# Patient Record
Sex: FEMALE | Race: WHITE | NOT HISPANIC OR LATINO
[De-identification: names, ages, dates, MRNs, and addresses within clinical notes are randomized per-mention and may not be internally consistent; named-entity substitution may affect disease eponyms.]

---

## 2018-06-18 ENCOUNTER — RESULT REVIEW (OUTPATIENT)
Age: 43
End: 2018-06-18

## 2019-03-04 ENCOUNTER — APPOINTMENT (OUTPATIENT)
Dept: ORTHOPEDIC SURGERY | Facility: CLINIC | Age: 44
End: 2019-03-04
Payer: COMMERCIAL

## 2019-03-04 VITALS
BODY MASS INDEX: 29.02 KG/M2 | HEART RATE: 99 BPM | HEIGHT: 64 IN | DIASTOLIC BLOOD PRESSURE: 74 MMHG | SYSTOLIC BLOOD PRESSURE: 109 MMHG | WEIGHT: 170 LBS

## 2019-03-04 DIAGNOSIS — Z86.018 PERSONAL HISTORY OF OTHER BENIGN NEOPLASM: ICD-10-CM

## 2019-03-04 DIAGNOSIS — Z80.9 FAMILY HISTORY OF MALIGNANT NEOPLASM, UNSPECIFIED: ICD-10-CM

## 2019-03-04 DIAGNOSIS — M54.9 DORSALGIA, UNSPECIFIED: ICD-10-CM

## 2019-03-04 DIAGNOSIS — Z87.09 PERSONAL HISTORY OF OTHER DISEASES OF THE RESPIRATORY SYSTEM: ICD-10-CM

## 2019-03-04 DIAGNOSIS — M51.36 OTHER INTERVERTEBRAL DISC DEGENERATION, LUMBAR REGION: ICD-10-CM

## 2019-03-04 PROCEDURE — 72170 X-RAY EXAM OF PELVIS: CPT | Mod: 59

## 2019-03-04 PROCEDURE — 99204 OFFICE O/P NEW MOD 45 MIN: CPT | Mod: 25

## 2019-03-04 PROCEDURE — 72110 X-RAY EXAM L-2 SPINE 4/>VWS: CPT

## 2019-03-04 PROCEDURE — 96372 THER/PROPH/DIAG INJ SC/IM: CPT

## 2019-03-04 RX ORDER — OMEPRAZOLE MAGNESIUM 20 MG/1
TABLET, DELAYED RELEASE ORAL
Refills: 0 | Status: ACTIVE | COMMUNITY

## 2019-03-04 NOTE — DISCUSSION/SUMMARY
[Medication Risks Reviewed] : Medication risks reviewed [de-identified] : The patient presents with acute back pain which is likely related to an annular tear of lumbar intervertebral disc. Offered her an injection of Toradol for this and she wanted to proceed. Under sterile conditions 30 mg of Toradol was administered intramuscularly by the LPN without incident. I prescribed her a course of diclofenac baclofen and physical therapy. She can use a lumbar corset for symptomatic relief as well. I will see her back in 2 weeks for reevaluation. If her symptoms persist or worsen an MRI lumbar spine can be considered.\par \par The patient was educated regarding their condition, treatment options as well as prescribed course of treatment. \par Risks and benefits as well as alternatives to the proposed treatment were also provided to the patient \par They were given the opportunity to have all their questions answered to their satisfaction.\par \par Vital signs were reviewed with the patient and the patient was instructed to followup with their primary care provider for further management.\par \par Healthy lifestyle recommendations were also made including a tobacco free lifestyle, proper diet, and weight control.

## 2019-03-04 NOTE — CONSULT LETTER
[Dear  ___] : Dear  [unfilled], [Consult Letter:] : I had the pleasure of evaluating your patient, [unfilled]. [FreeTextEntry2] : Kush Farrell [FreeTextEntry1] : Thank you for this referral. I have enclosed my note for your review. Please feel free to contact my office if you have additional questions regarding this patient.\par \par Regards,\par Jamie Hendricks MD, FACS, FAAOS\par \par  of Orthopaedic Surgery\par [facility] [employer]\par Spinal Reconstruction Surgery\par Minimally Invasive Spinal Surgery\par [facility]
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
## 2019-03-04 NOTE — PHYSICAL EXAM
[Stooped] : stooped [LE] : Sensory: Intact in bilateral lower extremities [Knee] : patellar 2+ and symmetric bilaterally [Ankle] : ankle 2+ and symmetric bilaterally [DP] : dorsalis pedis 2+ and symmetric bilaterally [PT] : posterior tibial 2+ and symmetric bilaterally [Obese] : obese [SLR] : negative straight leg raise [de-identified] : The pt is awake, alert and oriented to self, place and time, is comfortable and in no acute distress. Inspection of neck, back and lower extremities bilaterally reveals no rashes or ecchymotic lesions.  There is no obvious abnormal spinal curvature in the sagittal and coronal planes. There is no tenderness over the cervical, thoracic or lumbar spine, or the paraspinal or upper and lower extremities musculature. There is no sacroiliac tenderness. No greater trochanteric tenderness bilaterally. No atrophy or abnormal movements noted in the upper or lower extremities. There is no swelling noted in the upper or lower extremities bilaterally. No cervical lymphadenopathy noted anteriorly. No joint laxity noted in the upper and lower extremity joints bilaterally.\par Hip range of motion is degrees internal rotation 30° external rotation without pain. Full range of motion of the shoulders bilaterally with no significant pain\par There is no groin pain with hip internal rotation and a negative ELENITA test bilaterally.  [de-identified] : 4 views lumbar spine obtained today demonstrate minimal right-sided lumbar curve. Hyperlordosis noted lumbar spine. There is preservation of disc height throughout with minimal retrolisthesis suspected and L5-S1. No acute fractures. No dynamic instability between flexion-extension.\par \par AP pelvis demonstrates normal appearance of the hips bilaterally. No acute fractures there is no degeneration.

## 2019-04-22 ENCOUNTER — APPOINTMENT (OUTPATIENT)
Dept: ORTHOPEDIC SURGERY | Facility: CLINIC | Age: 44
End: 2019-04-22
Payer: COMMERCIAL

## 2019-04-22 VITALS — DIASTOLIC BLOOD PRESSURE: 88 MMHG | HEART RATE: 96 BPM | SYSTOLIC BLOOD PRESSURE: 129 MMHG

## 2019-04-22 PROCEDURE — 99214 OFFICE O/P EST MOD 30 MIN: CPT | Mod: 25

## 2019-04-22 PROCEDURE — 72050 X-RAY EXAM NECK SPINE 4/5VWS: CPT

## 2019-04-22 PROCEDURE — 96372 THER/PROPH/DIAG INJ SC/IM: CPT

## 2019-04-22 RX ORDER — KETOROLAC TROMETHAMINE 30 MG/ML
30 INJECTION, SOLUTION INTRAMUSCULAR; INTRAVENOUS
Qty: 1 | Refills: 0 | Status: COMPLETED | OUTPATIENT
Start: 2019-04-22

## 2019-04-22 RX ADMIN — KETOROLAC TROMETHAMINE 0 MG/ML: 30 INJECTION, SOLUTION INTRAMUSCULAR; INTRAVENOUS at 00:00

## 2019-05-23 ENCOUNTER — RX RENEWAL (OUTPATIENT)
Age: 44
End: 2019-05-23

## 2019-07-23 ENCOUNTER — APPOINTMENT (OUTPATIENT)
Dept: SURGERY | Facility: CLINIC | Age: 44
End: 2019-07-23
Payer: COMMERCIAL

## 2019-07-23 VITALS
BODY MASS INDEX: 27.14 KG/M2 | RESPIRATION RATE: 16 BRPM | SYSTOLIC BLOOD PRESSURE: 153 MMHG | HEART RATE: 102 BPM | DIASTOLIC BLOOD PRESSURE: 96 MMHG | WEIGHT: 159 LBS | HEIGHT: 64 IN | TEMPERATURE: 98.7 F | OXYGEN SATURATION: 98 %

## 2019-07-23 DIAGNOSIS — K62.89 OTHER SPECIFIED DISEASES OF ANUS AND RECTUM: ICD-10-CM

## 2019-07-23 DIAGNOSIS — Z86.018 PERSONAL HISTORY OF OTHER BENIGN NEOPLASM: ICD-10-CM

## 2019-07-23 PROCEDURE — 46600 DIAGNOSTIC ANOSCOPY SPX: CPT

## 2019-07-23 PROCEDURE — 99213 OFFICE O/P EST LOW 20 MIN: CPT | Mod: 25

## 2019-07-23 RX ORDER — GABAPENTIN 300 MG/1
300 CAPSULE ORAL
Qty: 30 | Refills: 0 | Status: DISCONTINUED | COMMUNITY
Start: 2019-04-22 | End: 2019-07-23

## 2019-07-23 RX ORDER — PRAMOXINE HYDROCHLORIDE 10 MG/ML
1 LOTION TOPICAL 4 TIMES DAILY
Qty: 1 | Refills: 3 | Status: ACTIVE | COMMUNITY
Start: 2019-07-23 | End: 1900-01-01

## 2019-07-23 RX ORDER — OMEPRAZOLE 20 MG/1
20 CAPSULE, DELAYED RELEASE ORAL
Qty: 60 | Refills: 0 | Status: DISCONTINUED | COMMUNITY
Start: 2018-10-05 | End: 2019-07-23

## 2019-07-23 RX ORDER — MULTIVITAMIN
CAPSULE ORAL
Refills: 0 | Status: ACTIVE | COMMUNITY

## 2019-07-23 NOTE — HISTORY OF PRESENT ILLNESS
[FreeTextEntry1] : The patient is an otherwise healthy 44-year-old woman who developed anal discomfort after an episode of diarrhea 2 months ago. The discomfort has improved however she still gets itching and. She denies rectal bleeding. She has no family history of colorectal cancer.

## 2019-07-23 NOTE — CONSULT LETTER
[Dear  ___] : Dear  [unfilled], [Consult Letter:] : I had the pleasure of evaluating your patient, [unfilled]. [Consult Closing:] : Thank you very much for allowing me to participate in the care of this patient.  If you have any questions, please do not hesitate to contact me. [Please see my note below.] : Please see my note below. [Sincerely,] : Sincerely, [FreeTextEntry3] : Mp Smith M.D., F.A.C.S, F.A.S.C.R.S

## 2019-07-23 NOTE — PHYSICAL EXAM
[Wheezing] : no wheezing was heard [Abdomen Tenderness] : ~T No ~M abdominal tenderness [Normal Rate and Rhythm] : normal rate and rhythm [Alert] : alert [No Rash or Lesion] : No rash or lesion [de-identified] : Perianal inspection reveals normal anal rectal tone. There is mild perianal excoriation. There is no fissure fistula or abscess. Anoscopy is normal except for mildly enlarged nonbleeding internal hemorrhoids. [Calm] : calm [de-identified] : nad [de-identified] : nl

## 2019-07-23 NOTE — ASSESSMENT
[FreeTextEntry1] : In summary the patient has mild pruritus and may have had an anal fissur which subsequently healed. I recommended fiber supplementation topical Desitin and prescribed Prax as needed. As she is 44 years old also recommended she undergo a screening colonoscopy once her perianal complaints have resolved

## 2019-08-26 ENCOUNTER — RESULT REVIEW (OUTPATIENT)
Age: 44
End: 2019-08-26

## 2020-06-22 ENCOUNTER — APPOINTMENT (OUTPATIENT)
Dept: ORTHOPEDIC SURGERY | Facility: CLINIC | Age: 45
End: 2020-06-22
Payer: COMMERCIAL

## 2020-06-22 VITALS
BODY MASS INDEX: 27.14 KG/M2 | DIASTOLIC BLOOD PRESSURE: 91 MMHG | HEIGHT: 64 IN | SYSTOLIC BLOOD PRESSURE: 142 MMHG | WEIGHT: 159 LBS | TEMPERATURE: 98.5 F | HEART RATE: 106 BPM

## 2020-06-22 DIAGNOSIS — G56.01 CARPAL TUNNEL SYNDROME, RIGHT UPPER LIMB: ICD-10-CM

## 2020-06-22 DIAGNOSIS — M54.2 CERVICALGIA: ICD-10-CM

## 2020-06-22 PROCEDURE — 99214 OFFICE O/P EST MOD 30 MIN: CPT

## 2020-06-22 PROCEDURE — 72050 X-RAY EXAM NECK SPINE 4/5VWS: CPT

## 2020-06-22 RX ORDER — BACLOFEN 10 MG/1
10 TABLET ORAL 3 TIMES DAILY
Qty: 30 | Refills: 0 | Status: ACTIVE | COMMUNITY
Start: 2019-03-04 | End: 1900-01-01

## 2020-06-22 RX ORDER — DICLOFENAC SODIUM 50 MG/1
50 TABLET, DELAYED RELEASE ORAL
Qty: 30 | Refills: 2 | Status: ACTIVE | COMMUNITY
Start: 2019-03-04 | End: 1900-01-01

## 2020-07-08 ENCOUNTER — OUTPATIENT (OUTPATIENT)
Dept: OUTPATIENT SERVICES | Facility: HOSPITAL | Age: 45
LOS: 1 days | End: 2020-07-08
Payer: COMMERCIAL

## 2020-07-08 ENCOUNTER — APPOINTMENT (OUTPATIENT)
Dept: MRI IMAGING | Facility: CLINIC | Age: 45
End: 2020-07-08

## 2020-07-08 DIAGNOSIS — Z00.8 ENCOUNTER FOR OTHER GENERAL EXAMINATION: ICD-10-CM

## 2020-07-08 PROCEDURE — 72141 MRI NECK SPINE W/O DYE: CPT | Mod: 26

## 2020-07-08 PROCEDURE — 72141 MRI NECK SPINE W/O DYE: CPT

## 2020-08-03 ENCOUNTER — APPOINTMENT (OUTPATIENT)
Dept: ORTHOPEDIC SURGERY | Facility: CLINIC | Age: 45
End: 2020-08-03
Payer: COMMERCIAL

## 2020-08-03 VITALS
BODY MASS INDEX: 27.14 KG/M2 | HEIGHT: 64 IN | WEIGHT: 159 LBS | TEMPERATURE: 97.8 F | HEART RATE: 102 BPM | DIASTOLIC BLOOD PRESSURE: 80 MMHG | SYSTOLIC BLOOD PRESSURE: 116 MMHG

## 2020-08-03 DIAGNOSIS — M54.12 RADICULOPATHY, CERVICAL REGION: ICD-10-CM

## 2020-08-03 DIAGNOSIS — M50.30 OTHER CERVICAL DISC DEGENERATION, UNSPECIFIED CERVICAL REGION: ICD-10-CM

## 2020-08-03 PROCEDURE — 99214 OFFICE O/P EST MOD 30 MIN: CPT

## 2020-08-03 NOTE — DISCUSSION/SUMMARY
[de-identified] : At this time the patient presents with symptoms primarily of neck pain and some tingling and numbness of her right hand.  She felt improvement of her right hand symptoms with use of a right wrist splint at night which is suggestive of carpal tunnel.  Recommended neurology evaluation for nerve conduction studies to assess cervical radiculopathy versus carpal tunnel syndrome right wrist.  A referral to a pain management specialist was also provided for consideration of cervical epidural steroid injection for right-sided neck and arm symptoms.  She understands that over long-term she may need surgical intervention with anterior cervical discectomy and fusion at C5-6 and C6-7 levels.  She knows that she also has some degenerative change at C4-5 and C3-4 which make her condition challenging to treat specially at her young age.  We discussed the option of acupuncture.  She will continue her own self-directed exercise program at home and is not interested in formal physical therapy.  She did not take baclofen as a muscle relaxant as previously prescribed and I encouraged her to use that for her symptoms on an as-needed basis.  I will see her back after the pain management and/or neurology evaluations in 6 to 8 weeks.\par \par The patient was educated regarding their condition, treatment options as well as prescribed course of treatment. \par Risks and benefits as well as alternatives to the proposed treatment were also provided to the patient \par They were given the opportunity to have all their questions answered to their satisfaction.\par \par Vital signs were reviewed with the patient and the patient was instructed to followup with their primary care provider for further management.\par \par Healthy lifestyle recommendations were also made including a tobacco free lifestyle, proper diet, and weight control. [Medication Risks Reviewed] : Medication risks reviewed

## 2020-08-03 NOTE — REASON FOR VISIT
[Degenerative Joint Disease] : degenerative joint disease [Follow-Up Visit] : a follow-up visit for [Radiculopathy] : radiculopathy [Neck Pain] : neck pain

## 2020-08-03 NOTE — PHYSICAL EXAM
[Normal] : Gait: normal [UE] : Sensory: Intact in bilateral upper extremities [Bicep] : biceps 2+ and symmetric bilaterally [B.R.] : biceps 2+ and symmetric bilaterally [Tricep] : triceps 2+ and symmetric bilaterally [Rad] : radial 2+ and symmetric bilaterally [de-identified] : Range of motion of the cervical spine is limited with forward flexion of 40° extension of 20 left or right lateral rotation 30° with painful end of range of motion\par + Tinels right wrist [Langford's Sign] : negative Langford's sign [de-identified] : The pt is awake, alert and oriented to self, place and time, is comfortable and in no acute distress. Gait evaluation reveals a narrow based, non-ataxic, non-antalgic gait. Negative Romberg sign noted. Can heel and toe walk without difficulty. Inspection of the neck, back and upper extremities bilaterally reveals no rashes or ecchymotic lesions. There is no obvious abnormal spinal curvature in the sagittal and coronal planes. No crepitus or instability noted with range of motion of bilateral upper extremities. No joint laxity noted in the upper and lower extremities bilaterally. No atrophy or abnormal movements noted in the upper or lower extremities. No tenderness over the cervical, thoracic, lumbar spine or in the paraspinal, or upper and lower extremity musculature. There is no swelling noted in the upper or lower extremities bilaterally. No cervical lymphadenopathy noted anteriorly.\par  Full range of motion of both shoulders. Negative Neer's sign and Hawkin's sign bilaterally. Negative Spurling's sign bilaterally. In the lumbar spine the patient can forward flex to mid tibia and extend 30 degrees without pain\par Negative Babinski sign and no clonus bilaterally in the upper or lower extremities. [de-identified] : EXAM: MR SPINE CERVICAL \par \par PROCEDURE DATE: 07/08/2020 \par \par INTERPRETATION: CLINICAL INFORMATION: Neck pain radiating to bilateral \par upper extremities with numbness and tingling in both hands, right worse than \par left. \par \par TECHNIQUE: Multiplanar, multi sequential MRI of the cervical spine was \par performed \par \par COMPARISON: None. \par \par FINDINGS: \par \par The cervical cord is normal in signal. The cervicomedullary junction is \par normal. \par \par There is reversal of the normal cervical lordosis. There is minimal \par anterolisthesis at C2-C3. \par \par There is disc height loss at C4-C5, C5-C6 and C6-C7. There are mixed Modic \par type I and type II endplate changes at C5-C6 and C6-C7. \par \par DISC LEVEL EVALUATION: \par \par C2/C3: Moderate right facet arthrosis with mild right foraminal narrowing. \par No spinal canal stenosis. \par C3/C4: Small disc bulge with mild uncovertebral spurring which is greater on \par the left. Moderate left foraminal narrowing. No spinal canal stenosis. \par C4/C5: Small disc bulge with uncovertebral spurring is greater on the left. \par Moderate left foraminal narrowing. No spinal canal stenosis. \par C5/C6: Disc bulge with uncovertebral spurring which is greater on the left. \par Moderate to severe left foraminal narrowing. Mild spinal canal stenosis. \par C6/C7: Disc bulge with uncovertebral spurring which is greater on the left. \par Moderate to severe left foraminal narrowing. No spinal canal stenosis. \par C7/T1: No disc bulge, spinal canal stenosis or foraminal narrowing. \par \par IMPRESSION: \par \par Multilevel cervical spondylosis, as above. This contributes to mild spinal \par canal stenosis at C5-C6 and multilevel foraminal narrowing, most pronounced \par on the left at C5-C6 and C6-C7. \par \par TOMY GONSALVES M.D., RADIOLOGY RESIDENT \par This document has been electronically signed. \par THOMAS HAMM M.D., ATTENDING RADIOLOGIST \par This document has been electronically signed. Jul 8 2020 3:31PM

## 2020-08-03 NOTE — HISTORY OF PRESENT ILLNESS
[3] : a current pain level of 3/10 [Stable] : stable [Lifting] : worsened by lifting [Bending] : worsened by bending [Daily] : ~He/She~ states the symptoms seem to be occuring daily [de-identified] : Patient is here today to review mri cervical spine 7/8/2020. Returned to work 7 weeks ago, bending, lifting organizing books.\par Has gone to PT in the past, doing own exercises at home.\par Using night splint right hand with some numbness which has improved\par Doing elliptical, feeling better with it\par Feels stiffness, pain right side of neck. did not take baclofen [de-identified] : advil exercise [de-identified] : looking down

## 2020-08-03 NOTE — RETURN TO WORK/SCHOOL
[FreeTextEntry1] : Patient was seen in our office today needs the following restriction no lifting more than 10 pounds until further notice.

## 2020-10-13 ENCOUNTER — RESULT REVIEW (OUTPATIENT)
Age: 45
End: 2020-10-13

## 2020-10-30 ENCOUNTER — RESULT REVIEW (OUTPATIENT)
Age: 45
End: 2020-10-30

## 2020-11-13 ENCOUNTER — APPOINTMENT (OUTPATIENT)
Dept: NEUROLOGY | Facility: CLINIC | Age: 45
End: 2020-11-13

## 2021-04-17 ENCOUNTER — EMERGENCY (EMERGENCY)
Facility: HOSPITAL | Age: 46
LOS: 1 days | Discharge: HOME | End: 2021-04-17
Attending: EMERGENCY MEDICINE
Payer: COMMERCIAL

## 2021-04-17 VITALS
WEIGHT: 153 LBS | DIASTOLIC BLOOD PRESSURE: 92 MMHG | TEMPERATURE: 98 F | HEART RATE: 96 BPM | HEIGHT: 64 IN | RESPIRATION RATE: 18 BRPM | SYSTOLIC BLOOD PRESSURE: 150 MMHG | OXYGEN SATURATION: 98 %

## 2021-04-17 VITALS
DIASTOLIC BLOOD PRESSURE: 89 MMHG | RESPIRATION RATE: 18 BRPM | OXYGEN SATURATION: 99 % | SYSTOLIC BLOOD PRESSURE: 140 MMHG | TEMPERATURE: 98 F | HEART RATE: 83 BPM

## 2021-04-17 LAB
ALBUMIN SERPL ELPH-MCNC: 4.8 G/DL — SIGNIFICANT CHANGE UP (ref 3.3–5)
ALP SERPL-CCNC: 77 U/L — SIGNIFICANT CHANGE UP (ref 40–120)
ALT FLD-CCNC: 27 U/L — SIGNIFICANT CHANGE UP (ref 10–45)
ANION GAP SERPL CALC-SCNC: 12 MMOL/L — SIGNIFICANT CHANGE UP (ref 5–17)
APPEARANCE UR: CLEAR — SIGNIFICANT CHANGE UP
AST SERPL-CCNC: 21 U/L — SIGNIFICANT CHANGE UP (ref 10–40)
BASOPHILS # BLD AUTO: 0.05 K/UL — SIGNIFICANT CHANGE UP (ref 0–0.2)
BASOPHILS NFR BLD AUTO: 0.5 % — SIGNIFICANT CHANGE UP (ref 0–2)
BILIRUB SERPL-MCNC: 0.6 MG/DL — SIGNIFICANT CHANGE UP (ref 0.2–1.2)
BILIRUB UR-MCNC: NEGATIVE — SIGNIFICANT CHANGE UP
BUN SERPL-MCNC: 9 MG/DL — SIGNIFICANT CHANGE UP (ref 7–23)
CALCIUM SERPL-MCNC: 9.5 MG/DL — SIGNIFICANT CHANGE UP (ref 8.4–10.5)
CHLORIDE SERPL-SCNC: 106 MMOL/L — SIGNIFICANT CHANGE UP (ref 96–108)
CO2 SERPL-SCNC: 24 MMOL/L — SIGNIFICANT CHANGE UP (ref 22–31)
COLOR SPEC: YELLOW — SIGNIFICANT CHANGE UP
CREAT SERPL-MCNC: 0.67 MG/DL — SIGNIFICANT CHANGE UP (ref 0.5–1.3)
DIFF PNL FLD: NEGATIVE — SIGNIFICANT CHANGE UP
EOSINOPHIL # BLD AUTO: 0.06 K/UL — SIGNIFICANT CHANGE UP (ref 0–0.5)
EOSINOPHIL NFR BLD AUTO: 0.6 % — SIGNIFICANT CHANGE UP (ref 0–6)
GLUCOSE SERPL-MCNC: 89 MG/DL — SIGNIFICANT CHANGE UP (ref 70–99)
GLUCOSE UR QL: NEGATIVE — SIGNIFICANT CHANGE UP
HCG UR QL: NEGATIVE — SIGNIFICANT CHANGE UP
HCT VFR BLD CALC: 46.2 % — HIGH (ref 34.5–45)
HGB BLD-MCNC: 15.3 G/DL — SIGNIFICANT CHANGE UP (ref 11.5–15.5)
IMM GRANULOCYTES NFR BLD AUTO: 0.3 % — SIGNIFICANT CHANGE UP (ref 0–1.5)
KETONES UR-MCNC: ABNORMAL
LEUKOCYTE ESTERASE UR-ACNC: NEGATIVE — SIGNIFICANT CHANGE UP
LIDOCAIN IGE QN: 37 U/L — SIGNIFICANT CHANGE UP (ref 7–60)
LYMPHOCYTES # BLD AUTO: 2.1 K/UL — SIGNIFICANT CHANGE UP (ref 1–3.3)
LYMPHOCYTES # BLD AUTO: 20.1 % — SIGNIFICANT CHANGE UP (ref 13–44)
MCHC RBC-ENTMCNC: 31.6 PG — SIGNIFICANT CHANGE UP (ref 27–34)
MCHC RBC-ENTMCNC: 33.1 GM/DL — SIGNIFICANT CHANGE UP (ref 32–36)
MCV RBC AUTO: 95.5 FL — SIGNIFICANT CHANGE UP (ref 80–100)
MONOCYTES # BLD AUTO: 0.53 K/UL — SIGNIFICANT CHANGE UP (ref 0–0.9)
MONOCYTES NFR BLD AUTO: 5.1 % — SIGNIFICANT CHANGE UP (ref 2–14)
NEUTROPHILS # BLD AUTO: 7.7 K/UL — HIGH (ref 1.8–7.4)
NEUTROPHILS NFR BLD AUTO: 73.4 % — SIGNIFICANT CHANGE UP (ref 43–77)
NITRITE UR-MCNC: NEGATIVE — SIGNIFICANT CHANGE UP
NRBC # BLD: 0 /100 WBCS — SIGNIFICANT CHANGE UP (ref 0–0)
PH UR: 6.5 — SIGNIFICANT CHANGE UP (ref 5–8)
PLATELET # BLD AUTO: 355 K/UL — SIGNIFICANT CHANGE UP (ref 150–400)
POTASSIUM SERPL-MCNC: 3.7 MMOL/L — SIGNIFICANT CHANGE UP (ref 3.5–5.3)
POTASSIUM SERPL-SCNC: 3.7 MMOL/L — SIGNIFICANT CHANGE UP (ref 3.5–5.3)
PROT SERPL-MCNC: 7.5 G/DL — SIGNIFICANT CHANGE UP (ref 6–8.3)
PROT UR-MCNC: SIGNIFICANT CHANGE UP
RBC # BLD: 4.84 M/UL — SIGNIFICANT CHANGE UP (ref 3.8–5.2)
RBC # FLD: 11.9 % — SIGNIFICANT CHANGE UP (ref 10.3–14.5)
SODIUM SERPL-SCNC: 142 MMOL/L — SIGNIFICANT CHANGE UP (ref 135–145)
SP GR SPEC: 1.02 — SIGNIFICANT CHANGE UP (ref 1.01–1.02)
UROBILINOGEN FLD QL: NEGATIVE — SIGNIFICANT CHANGE UP
WBC # BLD: 10.47 K/UL — SIGNIFICANT CHANGE UP (ref 3.8–10.5)
WBC # FLD AUTO: 10.47 K/UL — SIGNIFICANT CHANGE UP (ref 3.8–10.5)

## 2021-04-17 PROCEDURE — 83690 ASSAY OF LIPASE: CPT

## 2021-04-17 PROCEDURE — 81025 URINE PREGNANCY TEST: CPT

## 2021-04-17 PROCEDURE — 80053 COMPREHEN METABOLIC PANEL: CPT

## 2021-04-17 PROCEDURE — 81003 URINALYSIS AUTO W/O SCOPE: CPT

## 2021-04-17 PROCEDURE — 99284 EMERGENCY DEPT VISIT MOD MDM: CPT | Mod: 25

## 2021-04-17 PROCEDURE — 85025 COMPLETE CBC W/AUTO DIFF WBC: CPT

## 2021-04-17 PROCEDURE — 96374 THER/PROPH/DIAG INJ IV PUSH: CPT

## 2021-04-17 PROCEDURE — 99285 EMERGENCY DEPT VISIT HI MDM: CPT

## 2021-04-17 PROCEDURE — 74177 CT ABD & PELVIS W/CONTRAST: CPT

## 2021-04-17 PROCEDURE — 74177 CT ABD & PELVIS W/CONTRAST: CPT | Mod: 26,MA

## 2021-04-17 RX ORDER — SODIUM CHLORIDE 9 MG/ML
1000 INJECTION INTRAMUSCULAR; INTRAVENOUS; SUBCUTANEOUS ONCE
Refills: 0 | Status: COMPLETED | OUTPATIENT
Start: 2021-04-17 | End: 2021-04-17

## 2021-04-17 RX ORDER — ACETAMINOPHEN 500 MG
975 TABLET ORAL ONCE
Refills: 0 | Status: COMPLETED | OUTPATIENT
Start: 2021-04-17 | End: 2021-04-17

## 2021-04-17 RX ORDER — KETOROLAC TROMETHAMINE 30 MG/ML
15 SYRINGE (ML) INJECTION ONCE
Refills: 0 | Status: DISCONTINUED | OUTPATIENT
Start: 2021-04-17 | End: 2021-04-17

## 2021-04-17 RX ADMIN — Medication 15 MILLIGRAM(S): at 16:43

## 2021-04-17 RX ADMIN — Medication 1 TABLET(S): at 20:57

## 2021-04-17 RX ADMIN — Medication 975 MILLIGRAM(S): at 16:43

## 2021-04-17 RX ADMIN — SODIUM CHLORIDE 1000 MILLILITER(S): 9 INJECTION INTRAMUSCULAR; INTRAVENOUS; SUBCUTANEOUS at 16:39

## 2021-04-17 NOTE — ED PROVIDER NOTE - MUSCULOSKELETAL, MLM
Spine appears normal, range of motion is not limited, no muscle or joint tenderness **ATTENDING ADDENDUM (Dr. Edil Lloyd): Symmetrically equal strength, 5/5, in the bilateral upper and lower extremities. NO cords, soft-tissue swelling, or calf tenderness in the bilateral lower extremities.

## 2021-04-17 NOTE — ED PROVIDER NOTE - CHPI ED SYMPTOMS NEG
no abdominal distension/no dysuria/no fever/no hematuria/no nausea/no vomiting/no burning urination/no chills

## 2021-04-17 NOTE — ED PROVIDER NOTE - PHYSICAL EXAMINATION
GENERAL: no acute distress, non-toxic appearing  HEENT: normal nonicteric conjunctiva, oral mucosa moist  CARDIAC: regular rate and regular rhythm, normal S1 and S2, no appreciable murmurs  PULM: clear to ascultation bilaterally, sats 100% on RA, no increased work of breathing  GI: abdomen nondistended, soft, L mid abd tend without rebuond/guarding  : no CVA tenderness, no suprapubic tenderness  NEURO: alert and oriented x 3, normal speech, moving all extremities without lateralization  MSK: no visible deformities, no peripheral edema, calf tenderness/redness/swelling  SKIN: no visible rashes  PSYCH: appropriate mood and affect GENERAL: no acute distress, non-toxic appearing  HEENT: normal nonicteric conjunctiva, oral mucosa moist  CARDIAC: regular rate and regular rhythm, normal S1 and S2, no appreciable murmurs  PULM: clear to ascultation bilaterally, sats 100% on RA, no increased work of breathing  GI: abdomen nondistended, soft, L mid abd tend without rebuond/guarding  : no CVA tenderness, no suprapubic tenderness  NEURO: alert and oriented x 3, normal speech, moving all extremities without lateralization  MSK: no visible deformities, no peripheral edema, calf tenderness/redness/swelling  SKIN: no visible rashes  PSYCH: appropriate mood and affect  **ATTENDING ADDENDUM (Dr. Edil Lloyd): I have reviewed and substantially contributed to the elements of the PE as documented above. I have directly performed an examination of this patient in conjunction with the other members (EM resident/PA/NP) of the patient care team. I have personally reviewed the patient's vital signs at the time of the patient's initial presentation to the ED and repeatedly throughout the ED course.

## 2021-04-17 NOTE — ED PROVIDER NOTE - NS ED ROS FT
+L sided abd pain/bloating +L sided abd pain/bloating  **ATTENDING ADDENDUM (Dr. Edil Lloyd): During my interview with the patient, I have personally obtained and/or have directly verified the elements in the past medical/surgical history and other histories as noted earlier in the EMR, in conjunction with the other members (EM resident/PA/NP) of the patient care team. I have also personally obtained and/or have directly verified/reviewed the review of systems as documented below, in conjunction with the other members (EM resident/PA/NP) of the patient care team.

## 2021-04-17 NOTE — ED ADULT NURSE REASSESSMENT NOTE - NS ED NURSE REASSESS COMMENT FT1
Received patient from GENIE Vaca, patient at baseline mental status, able to make needs known, NAD, patient agreeable to plan of care, pending CTr, comfort and safety provided.

## 2021-04-17 NOTE — ED PROVIDER NOTE - ABDOMINAL TENDER
**ATTENDING ADDENDUM (Dr. Edil Lloyd): NO costovertebral angle tenderness with percussion in the bilateral lower extremities./left lower quadrant

## 2021-04-17 NOTE — ED PROVIDER NOTE - SHIFT CHANGE DETAILS
**ATTENDING ADDENDUM - HANDOFF (from Dr. Edil Lloyd): (1) Follow up CT and remainder of ED diagnostics (2) serial reevaluation / observation (3) disposition pending, very possibly discharge home with oral antibiotics for suspected uncomplicated diverticulitis.

## 2021-04-17 NOTE — ED PROVIDER NOTE - PMH
Ovarian cyst     <<----- Click to add NO pertinent Past Medical History No pertinent past medical history

## 2021-04-17 NOTE — ED PROVIDER NOTE - PROGRESS NOTE DETAILS
Fang Dey MD, PGY3: Patient received at resident sign out. CT scan with uncomplicated diverticulitis, well appearing, PO abx and out pt PCP or GI follow up, pt prefers to go back to her PCP, augmentin. I have given the pt strict return and follow up precautions. The patient has been provided with a copy of all pertinent results. The patient has been informed of all concerning signs and symptoms to return to Emergency Department, the necessity to follow up with PMD/Clinic/follow up provided within 2-3 days was explained, and the patient reports understanding of above with capacity and insight. **ATTENDING ADDENDUM (Dr. Edil Lloyd): patient serially evaluated throughout ED course. NO acute deterioration up to this time in the ED. Initial ED diagnostics up to this time acknowledged, reviewed and noted. Awaiting completion of CT and readings. Anticipatory guidance provided. Evening ED team Will continue to observe and monitor closely. **ATTENDING ADDENDUM (Dr. Edil Lloyd): Agree with goals/plan of ED care as described in EMR, including diagnostics, therapeutics and consultation as clinically warranted. Extensive anticipatory guidance provided to patient +/or family member(s) at time of initial ED attending evaluation. Will continue to observe and monitor closely.

## 2021-04-17 NOTE — ED PROVIDER NOTE - CARE PLAN
Principal Discharge DX:	Diverticulitis   Principal Discharge DX:	Diverticulitis  Goal:	**ATTENDING ADDENDUM (Dr. Edil Lloyd): Goals of care include resolution of emergent/urgent symptoms and concerns, and restoration to baseline level of homeostasis.

## 2021-04-17 NOTE — ED PROVIDER NOTE - ATTENDING CONTRIBUTION TO CARE
**ATTENDING ADDENDUM (Dr. Edil Lloyd): I attest that I have directly examined this patient and reviewed and formulated the diagnostic and therapeutic management plan in collaboration with the EM resident. Please see MDM note and remainder of EMR for findings from CC, HPI, ROS, and PE. (Rob)

## 2021-04-17 NOTE — ED PROVIDER NOTE - ABDOMINAL EXAM
**ATTENDING ADDENDUM (Dr. Edil Lloyd): POSITIVE tenderness to palpation in the left lower quadrant. Minimal tenderness over this area with percussion. Non-distended. NO rebound or rigidity. NO pulsatile or non-pulsatile masses. NO hernias. NO obvious hepatosplenomegaly./soft/tender.../nondistended/no organomegaly/no pulsating masses

## 2021-04-17 NOTE — ED PROVIDER NOTE - GENITOURINARY NEGATIVE STATEMENT, MLM
no dysuria, no frequency, and no hematuria. no dysuria, no frequency, and no hematuria. **ATTENDING ADDENDUM (Dr. Edil Lloyd): POSITIVE history of ovarian cyst.

## 2021-04-17 NOTE — ED PROVIDER NOTE - PLAN OF CARE
**ATTENDING ADDENDUM (Dr. Edil Lloyd): Goals of care include resolution of emergent/urgent symptoms and concerns, and restoration to baseline level of homeostasis.

## 2021-04-17 NOTE — ED PROVIDER NOTE - OBJECTIVE STATEMENT
46F no PMH, presents with left sided abd pain for the past 3 days associated with some bloating, never felt pain like this before, advil only gives her little relief. Denies any fevers/chills, uri sxs, cough, n/v/d, dark/bloody stools, urinary sxs, flank pain, rash. No hx abd surg 46F no PMH, presents with left sided abd pain for the past 3 days associated with some bloating, never felt pain like this before, advil only gives her little relief. Denies any fevers/chills, uri sxs, cough, n/v/d, dark/bloody stools, urinary sxs, flank pain, rash. No hx abd surg  **ATTENDING ADDENDUM (Dr. Edil Lloyd): I attest that I have directly and personally interviewed and examined this patient and elicited a comparable history of present illness and review of systems as documented, along with my EM resident. I attest that I have made significant contributions to the documentation where necessary and as noted in the EMR. Of note, and in addition to the above, the patient reports that she has a prior history of right-sided ovarian cyst. This discomfort is not the same as that remembered for ovarian cyst. VAS 2-3/10. 46F no PMH, presents with left sided abd pain for the past 3 days associated with some bloating, never felt pain like this before, advil only gives her little relief. Denies any fevers/chills, uri sxs, cough, n/v/d, dark/bloody stools, urinary sxs, flank pain, rash. No hx abd surg  **ATTENDING ADDENDUM (Dr. Edil Lloyd): I attest that I have directly and personally interviewed and examined this patient and elicited a comparable history of present illness and review of systems as documented, along with my EM resident. I attest that I have made significant contributions to the documentation where necessary and as noted in the EMR. VAS 2-3/10. 46F no PMH, presents with left sided abd pain for the past 3 days associated with some bloating, never felt pain like this before, advil only gives her little relief. Denies any fevers/chills, uri sxs, cough, n/v/d, dark/bloody stools, urinary sxs, flank pain, rash. No hx abd surg  **ATTENDING ADDENDUM (Dr. Edil Lloyd): I attest that I have directly and personally interviewed and examined this patient and elicited a comparable history of present illness and review of systems as documented, along with my EM resident. I attest that I have made significant contributions to the documentation where necessary and as noted in the EMR. VAS 2-3/10. Of note, and in addition to the above, the patient reports that she has a prior history of right-sided ovarian cyst. This discomfort is not the same as that remembered for ovarian cyst.

## 2021-04-17 NOTE — ED PROVIDER NOTE - LAB INTERPRETATION
**ATTENDING ADDENDUM (Dr. Edil Lloyd): Labs reviewed and analyzed. Pertinent findings include: NO profound or severe leukocytosis, anemia, or electrolyte-metabolic-endocrine derangements. UA without findings suggestive of urinary tract infection or pyelonephritis.

## 2021-04-17 NOTE — ED ADULT NURSE NOTE - OBJECTIVE STATEMENT
46y female with no significant hx presents to the ER c/o abdominal pain. pt is alert and oriented x 4 and speaking coherently. 46y female with hx of GERD presents to the ER c/o abdominal pain. pt is alert and oriented x 4 and speaking coherently. Pts x 3 days she has had sharp intermittent LLQ pain. pt states that she also felt bloated the last 2 days, pt states the bloating improved today. pt states pressing the area makes the pain worse. pt c/o nausea, chills. pt denies cp, sob, fevers, vomiting, diarrhea, blood in stool urinary symptoms. pt states that her pain improved after taking advil this am. pt LMP ended 4/10. Pt in nad, vss. pending md marc. will reassess.

## 2021-04-17 NOTE — ED PROVIDER NOTE - AGGRAVATING FACTORS
**ATTENDING ADDENDUM (Dr. Edil Lloyd): NO movement-associated, pleuritic, positional, or exertional component to the symptoms. POSITIVE reproducible with palpation./palpation

## 2021-04-17 NOTE — ED PROVIDER NOTE - CHIEF COMPLAINT
The patient is a 46y Female complaining of abdominal pain. The patient is a 46y Female complaining of left lower quadrant abdominal pain.

## 2021-04-17 NOTE — ED PROVIDER NOTE - NSFOLLOWUPINSTRUCTIONS_ED_ALL_ED_FT
Diverticulitis    Diverticulitis is inflammation or infection of small pouches in your colon that form when you HAVE a condition called diverticulosis. This condition can range from mild to severe potentially leading to perforation or obstructions of your colon. Symptoms include abdominal pain, fever/chills, nausea, vomiting, diarrhea, constipation, or blood in your stool. If you were prescribed an antibiotic medicine, take it as told by your health care provider. Do not stop taking the antibiotic even if you start to feel better.    SEEK IMMEDIATE MEDICAL CARE IF YOU HAVE ANY OF THE FOLLOWING SYMPTOMS: worsening abdominal pain, high fever, inability to hold down liquids or medication, black or bloody stools, inability to pass gas, lightheadedness/dizziness, or a change in mental status.    (1) Follow up with your primary care physician as discussed. In addition, we did not find evidence of a life threatening illness on your testing here today, but listed below are the specialists that will be necessary to see as an outpatient to continue the workup.  Please call the numbers listed below or [phone] to set up the necessary appointments  or call [phone] to make an appointment with the clinic.  (2) You were seen for diverticulitis. A copy of your resulted labs, imaging, and findings have been provided to you.  (3) follow up with your Primary Care Doctor and continue the antibiotics  (4) Return immediately to the emergency department for new, persistent, or worsening symptoms or signs. Return immediately to the emergency department if you have chest pain, shortness of breath, loss of consciousness, or any other new concerns.    You had a thorough evaluation including an exam, labs and imaging.  1. You were seen for Constipation. A copy of your resulted labs, imaging, and findings have been provided to you.  2. Read Follow-up Instructions that you have given.  3. Take Tylenol 500 mg (1 or 2 every 6 hours) and/or naproxen 500 mg (1 every 12 hours) for pain.   4. Treatment varies but may include dietary modifications (more fiber-rich foods such as fruits, vegetables, and whole grains), lifestyle modifications (regular exercise, water intake), and possible medications. Add a fiber supplement like Psyllium (aka Metamucil, Konsyl) three times per day. Can double the amount taken three times a day, if needed. You can try a stimulant like Senna (aka Senexon, Senekot) as needed if all the above haven't worked but don't take the stimulant regularly.  5. You should also establish care with Gastroenterology by calling  [phone] to find a doctor affiliated with [facility] in your neighborhood & network. You have given the information of affiliate doctors. Return if symptoms worse, particularly if vomiting or severe pain. Follow up with your primary care doctor within 48 hours. Please call [phone] to make an appointment or with any questions you may have.  or call [phone] to make an appointment with the clinic.  6. Return immediately to the emergency department for new, persistent, or worsening symptoms or signs. Return immediately to the emergency department if you have chest pain, shortness of breath, loss of consciousness, or any other new concerns.

## 2021-04-17 NOTE — ED PROVIDER NOTE - RESPIRATORY, MLM
Breath sounds clear and equal bilaterally. **ATTENDING ADDENDUM (Dr. Edil Lloyd): NO wheezing, rales, rhonchi, crackles, stridor, drooling, retractions, nasal flaring, or tripoding.

## 2021-04-17 NOTE — ED PROVIDER NOTE - EYES, MLM
Clear bilaterally, pupils equal, round and reactive to light. **ATTENDING ADDENDUM (Dr. Edil Lloyd): Extraocular muscle movements intact. Clear corneas bilaterally, pupils equal and round. NO scleral icterus bilaterally.

## 2021-04-17 NOTE — ED PROVIDER NOTE - CLINICAL SUMMARY MEDICAL DECISION MAKING FREE TEXT BOX
Concern for diverticulitis, first episode/low grade temp, will do labs/CT/analgesia. Reassess. Concern for diverticulitis, first episode/low grade temp, will do labs/CT/analgesia. Reassess.  **ATTENDING MEDICAL DECISION MAKING/SYNTHESIS (Dr. Edil Lloyd): I have reviewed the Chief Concern(s), the HPI, the ROS, and have directly performed and confirmed the findings on the Physical Examination. I have reviewed the medical decision making with all providers, as applicable. The PROBLEM REPRESENTATION at this time is: 46-year-old woman with prior history of ovarian cyst now presenting with three days of progressively worsening left lower quadrant abdominal pain and cramps, without associated fevers, chills, nausea, vomiting, or severe diarrhea. Denies pregnancy. The MOST LIKELY DIAGNOSIS, and the LIST OF DIFFERENTIAL DIAGNOSES, includes (but is not limited to) the following: diverticulitis/colitis, other surgical abdominal pathology e.g. acute biliary disease (e.g. cholelithiasis, cholecystitis, or cholangitis), acute appendicitis, small bowel obstruction, large bowel obstruction, volvulus, acute intussusception, abscess, serious bacterial infection or sepsis/severe sepsis e.g. urinary tract infection, pyelonephritis, or equivalent, perforation, peritonitis, dehydration, electrolyte-metabolic-endocrine derangements, urologic cause e.g. obstructing ureteral stone, vascular cause e.g. AAA, dissection or equivalent, gastritis, gastroenteritis, musculoskeletal pain. The likelihood of each of these diagnoses has been appropriately considered in the context of this patient's presentation and my evaluation. PLAN: as described in EMR, including diagnostics, therapeutics and consultation as clinically warranted. I will continue to reevaluate the patient, including the results of all testing, and monitor response to therapy throughout the patient's course in the ED. Concern for diverticulitis, first episode/low grade temp, will do labs/CT/analgesia. Reassess.  **ATTENDING MEDICAL DECISION MAKING/SYNTHESIS (Dr. Edil Lloyd): I have reviewed the Chief Concern(s), the HPI, the ROS, and have directly performed and confirmed the findings on the Physical Examination. I have reviewed the medical decision making with all providers, as applicable. The PROBLEM REPRESENTATION at this time is: 46-year-old woman now presenting with three days of progressively worsening left lower quadrant abdominal pain and cramps, without associated fevers, chills, nausea, vomiting, or severe diarrhea. Denies pregnancy. The MOST LIKELY DIAGNOSIS, and the LIST OF DIFFERENTIAL DIAGNOSES, includes (but is not limited to) the following: diverticulitis/colitis, other surgical abdominal pathology e.g. acute biliary disease (e.g. cholelithiasis, cholecystitis, or cholangitis), acute appendicitis, small bowel obstruction, large bowel obstruction, volvulus, acute intussusception, abscess, serious bacterial infection or sepsis/severe sepsis e.g. urinary tract infection, pyelonephritis, or equivalent, perforation, peritonitis, dehydration, electrolyte-metabolic-endocrine derangements, urologic cause e.g. obstructing ureteral stone, vascular cause e.g. AAA, dissection or equivalent, gastritis, gastroenteritis, musculoskeletal pain, OB/GYN pathology e.g. ov. cyst, torsion, tubu-ovarian abscess or equivalent (considered, unlikely). The likelihood of each of these diagnoses has been appropriately considered in the context of this patient's presentation and my evaluation. PLAN: as described in EMR, including diagnostics, therapeutics and consultation as clinically warranted. I will continue to reevaluate the patient, including the results of all testing, and monitor response to therapy throughout the patient's course in the ED. Concern for diverticulitis, first episode/low grade temp, will do labs/CT/analgesia. Reassess.  **ATTENDING MEDICAL DECISION MAKING/SYNTHESIS (Dr. Edil Lloyd): I have reviewed the Chief Concern(s), the HPI, the ROS, and have directly performed and confirmed the findings on the Physical Examination. I have reviewed the medical decision making with all providers, as applicable. The PROBLEM REPRESENTATION at this time is: 46-year-old woman with prior history of ov. cyst now presenting with three days of progressively worsening left lower quadrant abdominal pain and cramps, without associated fevers, chills, nausea, vomiting, or severe diarrhea. Denies pregnancy. The MOST LIKELY DIAGNOSIS, and the LIST OF DIFFERENTIAL DIAGNOSES, includes (but is not limited to) the following: diverticulitis/colitis, other surgical abdominal pathology e.g. acute biliary disease (e.g. cholelithiasis, cholecystitis, or cholangitis), acute appendicitis, small bowel obstruction, large bowel obstruction, volvulus, acute intussusception, abscess, serious bacterial infection or sepsis/severe sepsis e.g. urinary tract infection, pyelonephritis, or equivalent, perforation, peritonitis, dehydration, electrolyte-metabolic-endocrine derangements, urologic cause e.g. obstructing ureteral stone, vascular cause e.g. AAA, dissection or equivalent, gastritis, gastroenteritis, musculoskeletal pain, OB/GYN pathology e.g. ov. cyst, torsion, tubu-ovarian abscess or equivalent (considered, unlikely). The likelihood of each of these diagnoses has been appropriately considered in the context of this patient's presentation and my evaluation. PLAN: as described in EMR, including diagnostics, therapeutics and consultation as clinically warranted. I will continue to reevaluate the patient, including the results of all testing, and monitor response to therapy throughout the patient's course in the ED.

## 2021-06-11 ENCOUNTER — RESULT REVIEW (OUTPATIENT)
Age: 46
End: 2021-06-11

## 2021-11-15 ENCOUNTER — RESULT REVIEW (OUTPATIENT)
Age: 46
End: 2021-11-15

## 2022-10-18 PROBLEM — N83.209 UNSPECIFIED OVARIAN CYST, UNSPECIFIED SIDE: Chronic | Status: ACTIVE | Noted: 2021-04-19

## 2022-12-28 ENCOUNTER — APPOINTMENT (OUTPATIENT)
Dept: OBGYN | Facility: CLINIC | Age: 47
End: 2022-12-28

## 2023-01-04 ENCOUNTER — TRANSCRIPTION ENCOUNTER (OUTPATIENT)
Age: 48
End: 2023-01-04

## 2023-01-04 ENCOUNTER — OUTPATIENT (OUTPATIENT)
Dept: OUTPATIENT SERVICES | Facility: HOSPITAL | Age: 48
LOS: 1 days | End: 2023-01-04
Payer: COMMERCIAL

## 2023-01-04 ENCOUNTER — APPOINTMENT (OUTPATIENT)
Dept: CT IMAGING | Facility: IMAGING CENTER | Age: 48
End: 2023-01-04
Payer: COMMERCIAL

## 2023-01-04 DIAGNOSIS — K57.92 DIVERTICULITIS OF INTESTINE, PART UNSPECIFIED, WITHOUT PERFORATION OR ABSCESS WITHOUT BLEEDING: ICD-10-CM

## 2023-01-04 PROCEDURE — 74177 CT ABD & PELVIS W/CONTRAST: CPT

## 2023-01-04 PROCEDURE — 74177 CT ABD & PELVIS W/CONTRAST: CPT | Mod: 26

## 2023-01-23 ENCOUNTER — APPOINTMENT (OUTPATIENT)
Dept: OBGYN | Facility: CLINIC | Age: 48
End: 2023-01-23
Payer: COMMERCIAL

## 2023-01-23 PROCEDURE — 99396 PREV VISIT EST AGE 40-64: CPT | Mod: 25

## 2023-01-23 PROCEDURE — 76830 TRANSVAGINAL US NON-OB: CPT

## 2023-01-23 PROCEDURE — 81002 URINALYSIS NONAUTO W/O SCOPE: CPT

## 2023-01-23 PROCEDURE — 82270 OCCULT BLOOD FECES: CPT

## 2023-10-13 ENCOUNTER — APPOINTMENT (OUTPATIENT)
Dept: CT IMAGING | Facility: CLINIC | Age: 48
End: 2023-10-13
Payer: COMMERCIAL

## 2023-10-13 ENCOUNTER — OUTPATIENT (OUTPATIENT)
Dept: OUTPATIENT SERVICES | Facility: HOSPITAL | Age: 48
LOS: 1 days | End: 2023-10-13
Payer: COMMERCIAL

## 2023-10-13 DIAGNOSIS — Z00.8 ENCOUNTER FOR OTHER GENERAL EXAMINATION: ICD-10-CM

## 2023-10-13 PROCEDURE — 70486 CT MAXILLOFACIAL W/O DYE: CPT | Mod: 26

## 2023-10-13 PROCEDURE — 70486 CT MAXILLOFACIAL W/O DYE: CPT

## 2024-01-29 ENCOUNTER — APPOINTMENT (OUTPATIENT)
Dept: OBGYN | Facility: CLINIC | Age: 49
End: 2024-01-29
Payer: COMMERCIAL

## 2024-01-29 PROCEDURE — 81002 URINALYSIS NONAUTO W/O SCOPE: CPT

## 2024-01-29 PROCEDURE — 36415 COLL VENOUS BLD VENIPUNCTURE: CPT

## 2024-01-29 PROCEDURE — 99396 PREV VISIT EST AGE 40-64: CPT | Mod: 25

## 2024-01-29 PROCEDURE — 76830 TRANSVAGINAL US NON-OB: CPT

## 2024-01-29 PROCEDURE — 82270 OCCULT BLOOD FECES: CPT

## 2024-04-26 ENCOUNTER — APPOINTMENT (OUTPATIENT)
Dept: OTOLARYNGOLOGY | Facility: CLINIC | Age: 49
End: 2024-04-26

## 2025-03-05 ENCOUNTER — APPOINTMENT (OUTPATIENT)
Dept: OBGYN | Facility: CLINIC | Age: 50
End: 2025-03-05
Payer: COMMERCIAL

## 2025-03-05 PROCEDURE — 81002 URINALYSIS NONAUTO W/O SCOPE: CPT

## 2025-03-05 PROCEDURE — 99459 PELVIC EXAMINATION: CPT

## 2025-03-05 PROCEDURE — 99396 PREV VISIT EST AGE 40-64: CPT

## 2025-06-08 NOTE — ED ADULT TRIAGE NOTE - RESPIRATORY RATE (BREATHS/MIN)
18 signed)  Emergency Attending Physician / Physician Assistant / Nurse Practitioner            Casper Childress MD  06/08/25 0642